# Patient Record
Sex: FEMALE | Employment: FULL TIME | URBAN - METROPOLITAN AREA
[De-identification: names, ages, dates, MRNs, and addresses within clinical notes are randomized per-mention and may not be internally consistent; named-entity substitution may affect disease eponyms.]

---

## 2025-02-19 ENCOUNTER — APPOINTMENT (OUTPATIENT)
Dept: OBSTETRICS AND GYNECOLOGY | Facility: CLINIC | Age: 45
End: 2025-02-19
Payer: COMMERCIAL

## 2025-02-19 ENCOUNTER — TELEPHONE (OUTPATIENT)
Dept: OBSTETRICS AND GYNECOLOGY | Facility: CLINIC | Age: 45
End: 2025-02-19

## 2025-02-19 VITALS
HEIGHT: 61 IN | BODY MASS INDEX: 28.21 KG/M2 | DIASTOLIC BLOOD PRESSURE: 64 MMHG | SYSTOLIC BLOOD PRESSURE: 114 MMHG | WEIGHT: 149.4 LBS

## 2025-02-19 DIAGNOSIS — Z80.9 FAMILY HISTORY OF CANCER: ICD-10-CM

## 2025-02-19 DIAGNOSIS — Z12.31 BREAST CANCER SCREENING BY MAMMOGRAM: ICD-10-CM

## 2025-02-19 DIAGNOSIS — R10.2 VAGINAL PAIN: ICD-10-CM

## 2025-02-19 DIAGNOSIS — R63.5 WEIGHT GAIN: ICD-10-CM

## 2025-02-19 DIAGNOSIS — N95.1 PERIMENOPAUSAL: Primary | ICD-10-CM

## 2025-02-19 DIAGNOSIS — M62.89 PELVIC FLOOR DYSFUNCTION IN FEMALE: ICD-10-CM

## 2025-02-19 PROCEDURE — 3008F BODY MASS INDEX DOCD: CPT | Performed by: NURSE PRACTITIONER

## 2025-02-19 PROCEDURE — 99203 OFFICE O/P NEW LOW 30 MIN: CPT | Performed by: NURSE PRACTITIONER

## 2025-02-19 PROCEDURE — 1036F TOBACCO NON-USER: CPT | Performed by: NURSE PRACTITIONER

## 2025-02-19 ASSESSMENT — ENCOUNTER SYMPTOMS
NEUROLOGICAL NEGATIVE: 0
MUSCULOSKELETAL NEGATIVE: 0
CARDIOVASCULAR NEGATIVE: 0
PSYCHIATRIC NEGATIVE: 0
EYES NEGATIVE: 0
ALLERGIC/IMMUNOLOGIC NEGATIVE: 0
GASTROINTESTINAL NEGATIVE: 0
ENDOCRINE NEGATIVE: 0
HEMATOLOGIC/LYMPHATIC NEGATIVE: 0
CONSTITUTIONAL NEGATIVE: 0
RESPIRATORY NEGATIVE: 0

## 2025-02-19 ASSESSMENT — PATIENT HEALTH QUESTIONNAIRE - PHQ9
2. FEELING DOWN, DEPRESSED OR HOPELESS: NOT AT ALL
1. LITTLE INTEREST OR PLEASURE IN DOING THINGS: NOT AT ALL
SUM OF ALL RESPONSES TO PHQ9 QUESTIONS 1 & 2: 0

## 2025-02-19 ASSESSMENT — PAIN SCALES - GENERAL: PAINLEVEL_OUTOF10: 0-NO PAIN

## 2025-02-19 NOTE — PROGRESS NOTES
"Subjective   Patient ID: Ninoska Loera is a 44 y.o. female who presents for No chief complaint on file..  HPI  PAP?   Mammogram; 5/2/24; negative   Pt presents to establish care. When she made appointment last fall, she was experiencing perimenopausal symptoms. Symptoms not present today.   Her PCP does not perform PAP smears; Last PAP 1/24/22 negative HPV and cytology     Concerns: weight gain  Thought she was experiencing perimenopausal symptoms in the fall for x3 months but admits to external stressors affecting as well, symptoms have since resided.     Using vaginal suppositories for \"pelvic floor pain\"; last used end of July 2024- went to pelvic floor therapy x1/time     Age at Menopause: n/a  Perimenopausal:   LMP 2/1/25- monthly, 26 day cycle length, x 2 days of bleeding - has gradually become lighter   Contraception: no, not needed     History of a DVT/PE: no  History of HTN: no  History of elevated cholesterol: no  Tobacco use: no  Personal history of breast cancer: no    History of HT: no   History of compounded bioidentical: no   History of OTC treatments for menopausal symptoms: no   History of prescription, non-hormonal medications for menopausal symptoms: no    Mood changes: in the fall; has resided   Sleep problems: in the fall; has resided   VMS: no   Joint pain: in the fall; has resided   Brain fog/difficulty concentrating: in the fall; has resided     GSM: vulvar pruritus, trial of vaginal probiotic which helped the pruritus; occurs x2/week - admits to fishy odor with, since December   are you sexually active?: no, has    do you have any loss in desire?: yes, d/t pain   do you have any pain with intercourse?: yes, with insertion and deep penetration; pelvic floor muscle pain   are you able to have an orgasm?: yes       Vaginal hygiene: water and soap   Urinary incontinence: mixed; previously went to PFPT but it was cost prohibitive     H/O of STI: HSV    requesting sti testing?: no  H/O " abnormal pap: 2007     Lives with: , x2 kids   Employment: [employer]   exercise: strength training     Calcium intake: no   Vitamin D intake: no   Increased risk of osteoporosis: no; adopted so unclear on true medical history   Fracture since menopause: no     FH of breast cancer: biological mother   FH of ovarian cancer: no   FH of colon cancer: dad, paternal grandfather   FH pancreatic cancer: biological mother  Review of Systems    Objective   Physical Exam  Genitourinary:     Comments: Mild erythema   No evidence of vulvar dermatoses or vulvodynia  Only pain with palpation of the pelvic floor is anterior at the 12:00 position     Scant vaginal discharge  I reviewed the specimen and these are my findings: wet mount: -yeast, -clue cells, -whiff test; no increase in WBC's          Assessment/Plan   Diagnoses and all orders for this visit:  Perimenopausal  Weight gain  -     Referral to Clinical Pharmacy; Future  Breast cancer screening by mammogram  -     BI mammo bilateral screening tomosynthesis; Future     Will contact pt with results of the vaginitis panel; if negative will consider vaginal estrogen   Perimenopausal s/s reviewed  Information on pelvic floor, Leva, and pelvic wands; pt will contact me with her decision  Referral to medical genetics d/t  cancer  Courtney Kehr, PA-S Jean M Marino, APRN-CNP 02/19/25 8:06 AM

## 2025-02-19 NOTE — TELEPHONE ENCOUNTER
Contacted pt  Name and  verified  Pt is aware of order will download devan and schedule appt at that time.  Pt verbalized understanding.  No further questions or concerns at this time.

## 2025-02-19 NOTE — TELEPHONE ENCOUNTER
----- Message from Giuseppe Dillon sent at 2/19/2025  9:31 AM EST -----  Can someone please contact her, I forgot to tell her that I would recommend medical genetics d/t her FH cancer; I placed the order if she is interested

## 2025-02-20 LAB — BV SCORE VAG QL: NORMAL

## 2025-03-28 NOTE — PROGRESS NOTES
"  Patient ID: Ninoska Loera is a 44 y.o. female who presents for No chief complaint on file..    Pt is here for First appointment.     Referring Provider: Giuseppe Dillon APRN-CNP  Reason for Referral: weight loss    Subjective     Medication and allergy reconciliation completed     Drug Interactions  No relevant drug interactions were noted.    Medication System Management  Patient's preferred pharmacy:     [id number] - [place], [place] - [address]  [address] [facility] [address]  Phone: [phone] Fax: [phone]    Adherence/Organization: n/a  Affordability/Accessibility: n/a    No current outpatient medications on file prior to visit.     No current facility-administered medications on file prior to visit.       Weight Loss  -not a fan of medications  -likes to keep body as clean as possible    LMP: 3/26  Uterus: Yes  Ovaries: Yes    Movement:   Joint pain? No  Osteopenia or osteoporosis: No    Lifestyle  Stress level (rate 1-10): less than 5 out of 10  No results found for: \"CORTFREE\"  Energy level (rate 1-10): 8 out of 10  Sleep? 7 hours (10pm-5-6am), quality is mostly good  Snoring/Sleep apnea? Yes   Recommend sleeping at incline  Daytime brain fog/memory troubles? no  How many steps/day on average? 10-13k steps/day  Recommend 7,000 steps per day, 7 days per week. Use a tracker for this such as oura, fit bit, whoop, etc.  Strength training? Yes  Recommend 2-3x/week for 20 minutes  Consider listening to podcast interviews with Shea Pritchett  Current dietary pattern (if following a specific pattern of eating):   Tracks macros, 100g protein day, [id number] antonio/day, works out 5/day lifting heavy, lifting 10-13k steps/day  6-7am workout  Protein and creatine following, greek yogurt + fruit  Mid-morning snack  Salad with 30g protein (chicken or tuna), cottage cheese, snap peas  Dinner 30g protein + vegetable   Weekends variable  Occasional energy drink (2x/week), coffee " "(Virtify milk), tea, water  1c coffee at waking, rarely caffeine after noon  Recommend small amount of protein prior to workout    Toxins:  Tobacco? No  Alcohol? Occasionally on the weekends - max 2 drinks, typically 1 drink  THC/CBD? No  Heating food in plastic? No  Plastic water bottles? No    History of Gout? No  BP Readings from Last 3 Encounters:   02/19/25 114/64     No results found for: \"URICACID\"    If yes or high fructose diet (soda, hfcs, agave, fruit juice, etc.) or high animal based protein diet order uric acid level.   Uric acid levels above 5.5 mg/dL are linked to increased risk of metabolic diseases.   Fructose metabolism generates uric acid, signaling to the body to increase storage of fat.   Uric acid decreases nitric oxide, constricting blood vessels and impairing insulin function.   High sodium intake can increase uric acid by signaling dehydration, but staying hydrated dilutes sodiums effect.   Elevated levels of uric acid increase hunger and impulsivity.     Medications potentially contributing to weight gain:   None    Medications tried/stopped for weight management:    None       Weight Loss Drug Therapy Options Screening:     Naltrexone and Bupropion  BMI >=30 kg/m2 or patients with a BMI >=27 kg/m2 and >=1 weight-associated comorbidity (eg, dyslipidemia):   Binge eating: No  Depression: No  PMH of Suicidal Ideation: No  Avoid use in patients with:  Uncontrolled hypertension: No  Seizure disorder: No  Eating disorder: No  Use of other bupropion-containing products: No  Chronic opioid use: No  Avoid use if on opioids. Patients treated with naltrexone may respond to lower opioid doses than previously used. This could result in potentially life-threatening opioid intoxication. Warn patients that any attempt to overcome opioid blockade during naltrexone therapy is dangerous and could potentially lead to fatal opioid overdose.  Within 14 days of monoamine oxidase inhibitors: No    GLP-1 and " "Metformin:   Pre-Diabetes/Diabetes? No, extensive family history  Lab Results   Component Value Date    HGBA1C 5.4 09/09/2020     No results found for: \"HSCRP\"    BMI >=30 kg/m2 or patients with a BMI >=27 kg/m2 and >=1 weight-associated comorbidity (eg, cardiovascular disease, dyslipidemia, hypertension, obstructive sleep apnea, type 2 diabetes mellitus):   PMH of Pancreatitis: No  PMH of Gallbladder disease: No  PMH of Delayed Gastric Emptying:   GI issues? Constipation - life long  Frequency of BM? 3x/week  PMH of MTC: no  PMH of Retinopathy: no    Topiramate:   Migraines? no    Adipex:   Anxiety? no    Thyroid health:   No results found for: \"TSH\", \"FREET4\", \"T3FREE\", \"T3\", \"THYROIDPAB\"     Palpitations:   No  Tremor (hands, fine, low-amplitude):   No  -If yes, check TPO antibodies  Menstrual cycle changes: Average every 26 days (25-29 days)     Family history of thyroid disease:   No  -High doses of biotin can cause falsely low TSH levels and falsely high levels of T3 and T4.   -Hold biotin for 3-5 days prior to lab draw     MHT  No results found for: \"ESTRADIOL\", \"TESTOST\", \"MFTEST\", \"MTESTO\", \"FSH\"  Current VMS? Maybe, isn't sure  Any Contraindications and/or Cautions to HT:   PH/FH breast cancer: Mother  PH/FH of ovarian cancer: No  PH/FH of uterine cancer: No  PH/FH of colon cancer: Father  PH/FH of pancreatic cancer: Mother  PH/FH Dementia: No  Stroke, MI, VTE, clotting disorder, or congenital heart disease: No  Systemic lupus erythematosus (increased clot risk): No    Concurrent Chronic Medical Conditions    Cardiovascular Health  The ASCVD Risk score (Casa DK, et al., 2019) failed to calculate for the following reasons:    Cannot find a previous HDL lab    Cannot find a previous total cholesterol lab    Unable to determine if patient is Non-   No results found for: \"CHOL\"  No results found for: \"HDL\"  No results found for: \"TRIG\"       No results found for: \"SBP\", " "\"DBP\"    Iron/B12/Folate Status  No results found for: \"IRON\", \"TIBC\", \"FERRITIN\"   No results found for: \"GEZMPKDJ93\", \"FOLATE\"    Liver Function  No results found for: \"AST\", \"ALT\", \"GGT\"     Kidney Function  No results found for: \"GFRF\", \"CREATININE\", \"ALBCREA\"    Vitamin D3  No results found for: \"VITD25\"      Lifestyle and Nourishment Recommendations (please review following our appointment)  Focus on whole foods as close to nature as possible (less than 5 ingredients on the label)  The order of eating matters during each meal, in order to minimize blood sugar spikes  First, 1/3 to 1/2 of meal as colorful vegetables eaten first  Increase fiber 25-30 grams daily (work up slowly to avoid GI distress)  Goal: Daily bowel movement  Second, eat protein and fat as part of your meal  Increase high quality protein to 25-30 grams per meal along with 2-3 x/week resistance training  May consider 3g/day of Creatine (CreaPure) daily mixed in water to support mood, cognition, muscle mass, and bone mass.    Third, have complex carbohydrates/starches as part of your meal  Consider drinking 1 tbsp of apple cider vinegar in a tall glass of water or as a salad dressing with extra virgin olive oil up to 20 minutes prior to or during a meal.   Helps to reduce blood sugar spikes from complex carbohydrates by up to 30%.   Beverages: Focus on filtered water, organic tea (loose leave or in paper, avoid plastic sachets), or sparkling/mineral water (ex. Spindrift, Erika)   No more than 1 cup (8oz) of organic coffee daily prior to noon. May consider organic green tea.   Avoid alcohol   Avoid ALL artificial sweeteners   Focus on building muscle- muscle is metabolic tissue  Strength training 2-3 times weekly for at least 20 minutes, max out at 8 reps, rest, repeat x 3.   Walk or move for 10 minutes after each meal - ideally outdoors  Goal: 7,000 steps per day, 7 days per week. Use a tracker for this such as oura, fit bit, whoop, etc. "   Manage stress: minimum of 2-5 minutes daily for meditation, minfulness, etc. to reduce cortisol levels.  Prioritize 7-9 hours of restful sleep nightly.   Turn off electronics 1 hour prior to bedtime, no electronics in the bedroom.  Establish a regular sleep/wake time (+/- 30 minutes)       Assessment/Plan     Patients goals:   To loose about 10 lbs  Discussed patients history, current medical conditions, medications, as well as current diet and lifestyle in depth.   Patient has been making diet/lifestyle changes for:   Current weight: 146 lbs  BMI: 28  Desired weight: 125 lbs (2010 last time, ran 1st marathon) - 135 lbs  Concurrent medical conditions: None  Heart Rate Variability (HRV) if known: Has a [place] watch, will check  Weight loss  Ordering labs today  Prefers not to utilize medications  MHT  Possible VMS, variable symptoms  Lifestyle:   Previously was running marathons, has stopped this to keep cortisol levels down. Now lifting heavy 5 days/week. Getting 10-13k/steps. Feels better/stronger focusing on weights than running.   Has a body comp scale at home, has noticed some muscle gain, but not significant  Getting 100g of protein/day  Eating very clean  Please review the above lifestyle and nourishment recommendations between now and our next appointment.   Please choose 1-3 items you are not currently doing to implement.   Consider utilizing Cronometer to track dietary intake for 3-5 days.   Consider utilizing a CGM for 14 days.   Blood sugar spikes are often followed by dips, which increase food cravings for the remainder of the day      LABS  Fasting insulin  Fasting glucose  A1C  TSH  Free T4  TPO  Uric Acid  Vitamin D  HS-CRP (if suspect inflammation)      Follow-up: 04/17/25 3:40pm     Time spent with pt: Total length of time 40 (minutes) of the encounter and more than 50% was spent counseling the patient.      Buffy Gamboa, Pharm.D, FAIHM, IFP, [facility]  Clinical Pharmacist  Pharmacy  Services  [phone]    Continue all meds under the continuation of care with the referring provider and clinical pharmacy team.    Verbal consent to manage patient's drug therapy was obtained from patient. They were informed they may decline to participate or withdraw from participation in pharmacy services at any time.

## 2025-03-31 ENCOUNTER — APPOINTMENT (OUTPATIENT)
Dept: PHARMACY | Facility: HOSPITAL | Age: 45
End: 2025-03-31
Payer: COMMERCIAL

## 2025-03-31 DIAGNOSIS — R63.5 WEIGHT GAIN: ICD-10-CM

## 2025-03-31 RX ORDER — BISMUTH SUBSALICYLATE 262 MG
1 TABLET,CHEWABLE ORAL DAILY
COMMUNITY

## 2025-03-31 RX ORDER — GLUCOSAM/CHONDRO/HERB 149/HYAL 750-100 MG
1 TABLET ORAL DAILY
COMMUNITY

## 2025-04-04 LAB
25(OH)D3+25(OH)D2 SERPL-MCNC: 29 NG/ML (ref 30–100)
ALBUMIN SERPL-MCNC: 4.8 G/DL (ref 3.6–5.1)
ALP SERPL-CCNC: 52 U/L (ref 31–125)
ALT SERPL-CCNC: 22 U/L (ref 6–29)
ANION GAP SERPL CALCULATED.4IONS-SCNC: 11 MMOL/L (CALC) (ref 7–17)
AST SERPL-CCNC: 21 U/L (ref 10–30)
BILIRUB SERPL-MCNC: 0.6 MG/DL (ref 0.2–1.2)
BUN SERPL-MCNC: 15 MG/DL (ref 7–25)
CALCIUM SERPL-MCNC: 9.4 MG/DL (ref 8.6–10.2)
CHLORIDE SERPL-SCNC: 100 MMOL/L (ref 98–110)
CO2 SERPL-SCNC: 27 MMOL/L (ref 20–32)
CREAT SERPL-MCNC: 0.78 MG/DL (ref 0.5–0.99)
EGFRCR SERPLBLD CKD-EPI 2021: 96 ML/MIN/1.73M2
EST. AVERAGE GLUCOSE BLD GHB EST-MCNC: 111 MG/DL
EST. AVERAGE GLUCOSE BLD GHB EST-SCNC: 6.2 MMOL/L
GLUCOSE SERPL-MCNC: 79 MG/DL (ref 65–99)
HBA1C MFR BLD: 5.5 % OF TOTAL HGB
INSULIN SERPL-ACNC: 3.2 UIU/ML
POTASSIUM SERPL-SCNC: 4.3 MMOL/L (ref 3.5–5.3)
PROT SERPL-MCNC: 7.6 G/DL (ref 6.1–8.1)
SODIUM SERPL-SCNC: 138 MMOL/L (ref 135–146)
T3FREE SERPL-MCNC: 3.3 PG/ML (ref 2.3–4.2)
TSH SERPL-ACNC: 1.97 MIU/L

## 2025-04-10 ENCOUNTER — APPOINTMENT (OUTPATIENT)
Facility: CLINIC | Age: 45
End: 2025-04-10
Payer: COMMERCIAL

## 2025-04-10 ENCOUNTER — TELEPHONE (OUTPATIENT)
Dept: GENETICS | Facility: CLINIC | Age: 45
End: 2025-04-10

## 2025-04-15 NOTE — PROGRESS NOTES
"  Patient ID: Ninoska Loera is a 44 y.o. female who presents for No chief complaint on file..    Pt is here for Follow Up appointment.     Referring Provider: Giuseppe Dillon APRN-CNP  Reason for Referral: weight loss    Subjective     Medication and allergy reconciliation completed     Drug Interactions  No relevant drug interactions were noted.    Medication System Management  Patient's preferred pharmacy:     [facility] [place], [place] - [address]  [address] [address]  Phone: [phone] Fax: [phone]    Adherence/Organization: n/a  Affordability/Accessibility: n/a    Current Outpatient Medications on File Prior to Visit   Medication Sig Dispense Refill    B.animalis,bifid,infantis,long (PROBIOTIC 4X ORAL) Take 1 tablet by mouth once daily.      CREATINE, BULK, MISC 1 Scoop once daily.      multivitamin tablet Take 1 tablet by mouth once daily.      omega 3-dha-epa-fish oil (Fish OiL) 1,000 (120-180) mg capsule Take 1 capsule (1,000 mg) by mouth once daily.       No current facility-administered medications on file prior to visit.       Weight Loss  -not a fan of medications  -likes to keep body as clean as possible    LMP: 3/26  Uterus: Yes  Ovaries: Yes    Movement:   Joint pain? No  Osteopenia or osteoporosis: No    Lifestyle  Stress level (rate 1-10): less than 5 out of 10  No results found for: \"CORTFREE\"  Energy level (rate 1-10): 8 out of 10  Sleep? 7 hours (10pm-5-6am), quality is mostly good  Snoring/Sleep apnea? Yes   Recommend sleeping at incline  Daytime brain fog/memory troubles? no  How many steps/day on average? 10-13k steps/day  Recommend 7,000 steps per day, 7 days per week. Use a tracker for this such as oura, fit bit, whoop, etc.  Strength training? Yes  Recommend 2-3x/week for 20 minutes  Consider listening to podcast interviews with Shea Pritchett  Current dietary pattern (if following a specific pattern of eating):   Tracks macros, 100g " "protein day, 9537-6563 antonio/day, works out 5/day lifting heavy, lifting 10-13k steps/day  6-7am workout  Protein and creatine following, greek yogurt + fruit  Mid-morning snack  Salad with 30g protein (chicken or tuna), cottage cheese, snap peas  Dinner 30g protein + vegetable   Weekends variable  Occasional energy drink (2x/week), coffee (fairlife milk), tea, water  1c coffee at waking, rarely caffeine after noon  Recommend small amount of protein prior to workout    Toxins:  Tobacco? No  Alcohol? Occasionally on the weekends - max 2 drinks, typically 1 drink  THC/CBD? No  Heating food in plastic? No  Plastic water bottles? No    History of Gout? No  BP Readings from Last 3 Encounters:   02/19/25 114/64     No results found for: \"URICACID\"    If yes or high fructose diet (soda, hfcs, agave, fruit juice, etc.) or high animal based protein diet order uric acid level.   Uric acid levels above 5.5 mg/dL are linked to increased risk of metabolic diseases.   Fructose metabolism generates uric acid, signaling to the body to increase storage of fat.   Uric acid decreases nitric oxide, constricting blood vessels and impairing insulin function.   High sodium intake can increase uric acid by signaling dehydration, but staying hydrated dilutes sodiums effect.   Elevated levels of uric acid increase hunger and impulsivity.     Medications potentially contributing to weight gain:   None    Medications tried/stopped for weight management:    None       Weight Loss Drug Therapy Options Screening:     Naltrexone and Bupropion  BMI >=30 kg/m2 or patients with a BMI >=27 kg/m2 and >=1 weight-associated comorbidity (eg, dyslipidemia):   Binge eating: No  Depression: No  PMH of Suicidal Ideation: No  Avoid use in patients with:  Uncontrolled hypertension: No  Seizure disorder: No  Eating disorder: No  Use of other bupropion-containing products: No  Chronic opioid use: No  Avoid use if on opioids. Patients treated with naltrexone may " "respond to lower opioid doses than previously used. This could result in potentially life-threatening opioid intoxication. Warn patients that any attempt to overcome opioid blockade during naltrexone therapy is dangerous and could potentially lead to fatal opioid overdose.  Within 14 days of monoamine oxidase inhibitors: No    GLP-1 and Metformin:   Pre-Diabetes/Diabetes? No, extensive family history  Lab Results   Component Value Date    INSULFAST 3.2 04/03/2025    HGBA1C 5.5 04/03/2025     No results found for: \"HSCRP\"    BMI >=30 kg/m2 or patients with a BMI >=27 kg/m2 and >=1 weight-associated comorbidity (eg, cardiovascular disease, dyslipidemia, hypertension, obstructive sleep apnea, type 2 diabetes mellitus):   PMH of Pancreatitis: No  PMH of Gallbladder disease: No  PMH of Delayed Gastric Emptying:   GI issues? Constipation - life long  Frequency of BM? 3x/week  PMH of MTC: no  PMH of Retinopathy: no    Topiramate:   Migraines? no    Adipex:   Anxiety? no    Thyroid health:   Lab Results   Component Value Date    TSH 1.97 04/03/2025    T3FREE 3.3 04/03/2025        Palpitations:   No  Tremor (hands, fine, low-amplitude):   No  -If yes, check TPO antibodies  Menstrual cycle changes: Average every 26 days (25-29 days)     Family history of thyroid disease:   No  -High doses of biotin can cause falsely low TSH levels and falsely high levels of T3 and T4.   -Hold biotin for 3-5 days prior to lab draw     MHT  No results found for: \"ESTRADIOL\", \"TESTOST\", \"MFTEST\", \"MTESTO\", \"FSH\"  Current VMS? Maybe, isn't sure  Any Contraindications and/or Cautions to HT:   PH/FH breast cancer: Mother  PH/FH of ovarian cancer: No  PH/FH of uterine cancer: No  PH/FH of colon cancer: Father  PH/FH of pancreatic cancer: Mother  PH/FH Dementia: No  Stroke, MI, VTE, clotting disorder, or congenital heart disease: No  Systemic lupus erythematosus (increased clot risk): No    Concurrent Chronic Medical Conditions    Cardiovascular " "Health  The ASCVD Risk score (Casa VERA, et al., 2019) failed to calculate for the following reasons:    Cannot find a previous HDL lab    Cannot find a previous total cholesterol lab    Unable to determine if patient is Non-   No results found for: \"CHOL\"  No results found for: \"HDL\"  No results found for: \"TRIG\"       No results found for: \"SBP\", \"DBP\"    Iron/B12/Folate Status  No results found for: \"IRON\", \"TIBC\", \"FERRITIN\"   No results found for: \"FZPYVQMM55\", \"FOLATE\"    Liver Function  Lab Results   Component Value Date    AST 21 04/03/2025    ALT 22 04/03/2025        Kidney Function  Lab Results   Component Value Date    CREATININE 0.78 04/03/2025       Vitamin D3  Lab Results   Component Value Date    VITD25 29 (L) 04/03/2025         Lifestyle and Nourishment Recommendations (please review following our appointment)  Focus on whole foods as close to nature as possible (less than 5 ingredients on the label)  The order of eating matters during each meal, in order to minimize blood sugar spikes  First, 1/3 to 1/2 of meal as colorful vegetables eaten first  Increase fiber 25-30 grams daily (work up slowly to avoid GI distress)  Goal: Daily bowel movement  Second, eat protein and fat as part of your meal  Increase high quality protein to 25-30 grams per meal along with 2-3 x/week resistance training  May consider 3g/day of Creatine (CreaPure) daily mixed in water to support mood, cognition, muscle mass, and bone mass.    Third, have complex carbohydrates/starches as part of your meal  Consider drinking 1 tbsp of apple cider vinegar in a tall glass of water or as a salad dressing with extra virgin olive oil up to 20 minutes prior to or during a meal.   Helps to reduce blood sugar spikes from complex carbohydrates by up to 30%.   Beverages: Focus on filtered water, organic tea (loose leave or in paper, avoid plastic sachets), or sparkling/mineral water (ex. Spindrift, Erika)   No more " than 1 cup (8oz) of organic coffee daily prior to noon. May consider organic green tea.   Avoid alcohol   Avoid ALL artificial sweeteners   Focus on building muscle- muscle is metabolic tissue  Strength training 2-3 times weekly for at least 20 minutes, max out at 8 reps, rest, repeat x 3.   Walk or move for 10 minutes after each meal - ideally outdoors  Goal: 7,000 steps per day, 7 days per week. Use a tracker for this such as oura, fit bit, whoop, etc.   Manage stress: minimum of 2-5 minutes daily for meditation, minfulness, etc. to reduce cortisol levels.  Prioritize 7-9 hours of restful sleep nightly.   Turn off electronics 1 hour prior to bedtime, no electronics in the bedroom.  Establish a regular sleep/wake time (+/- 30 minutes)       Assessment/Plan     Patients goals:   To loose about 10 lbs  Discussed patients history, current medical conditions, medications, as well as current diet and lifestyle in depth.   Patient has been making diet/lifestyle changes for:   Current weight: 146 lbs  BMI: 28  Desired weight: 125 lbs (2010 last time, ran 1st marathon) - 135 lbs  Concurrent medical conditions: None  Heart Rate Variability (HRV) if known: Has a [employer] watch, will check  Weight loss  Recommend adding Vitamin D 5000 international units daily, with a fat containing meal, goal 50-70.    Prefers not to utilize medications for weight loss  Provided patient education and counseling on Freestyle Kimberly CGM  Provided link to [employer]  MHT  Possible VMS, variable symptoms  Lifestyle:   Previously was running marathons, has stopped this to keep cortisol levels down. Now lifting heavy 5 days/week. Getting 10-13k/steps. Feels better/stronger focusing on weights than running.   Has a body comp scale at home, has noticed some muscle gain, but not significant  Getting 100g of protein/day  Eating very clean  Please review the above lifestyle and nourishment recommendations between now and our next appointment.    Please choose 1-3 items you are not currently doing to implement.         Follow-up: 5/6/25 4pm     Time spent with pt: Total length of time 20 (minutes) of the encounter and more than 50% was spent counseling the patient.      Buffy Gamboa, Pharm.D, FAIHM, IFP, [facility]  Clinical Pharmacist  Pharmacy Services  [phone]    Continue all meds under the continuation of care with the referring provider and clinical pharmacy team.    Verbal consent to manage patient's drug therapy was obtained from patient. They were informed they may decline to participate or withdraw from participation in pharmacy services at any time.

## 2025-04-17 ENCOUNTER — APPOINTMENT (OUTPATIENT)
Dept: PHARMACY | Facility: HOSPITAL | Age: 45
End: 2025-04-17
Payer: COMMERCIAL

## 2025-04-17 DIAGNOSIS — R63.5 WEIGHT GAIN: ICD-10-CM

## 2025-04-17 PROCEDURE — RXMED WILLOW AMBULATORY MEDICATION CHARGE

## 2025-04-17 RX ORDER — BLOOD-GLUCOSE SENSOR
EACH MISCELLANEOUS
Qty: 1 EACH | Refills: 11 | Status: SHIPPED | OUTPATIENT
Start: 2025-04-17

## 2025-04-18 ENCOUNTER — PHARMACY VISIT (OUTPATIENT)
Dept: PHARMACY | Facility: CLINIC | Age: 45
End: 2025-04-18
Payer: MEDICARE

## 2025-05-06 ENCOUNTER — APPOINTMENT (OUTPATIENT)
Dept: PHARMACY | Facility: HOSPITAL | Age: 45
End: 2025-05-06
Payer: COMMERCIAL

## 2025-05-06 DIAGNOSIS — R63.5 WEIGHT GAIN: ICD-10-CM

## 2025-05-06 NOTE — PROGRESS NOTES
"  Patient ID: Ninoska Loera is a 44 y.o. female who presents for No chief complaint on file..    Pt is here for Follow Up appointment.     Referring Provider: Giuseppe Dillon APRN-CNP  Reason for Referral: weight loss    Subjective     Medication and allergy reconciliation completed     Drug Interactions  No relevant drug interactions were noted.    Medication System Management  Patient's preferred pharmacy:     [facility] - [place], [place] - [address]  [address] [id number]  Phone: [phone] Fax: [phone]    [facility]  [address], [address]  [facility] [address]  Phone: [phone] Fax: [phone]    Adherence/Organization: n/a  Affordability/Accessibility: n/a    Current Outpatient Medications on File Prior to Visit   Medication Sig Dispense Refill    B.animalis,bifid,infantis,long (PROBIOTIC 4X ORAL) Take 1 tablet by mouth once daily.      blood-glucose sensor (FreeStyle Kimberly 3 Plus Sensor) device Apply/remove 1 sensor every 15 days as directed 1 each 11    CREATINE, BULK, MISC 1 Scoop once daily.      multivitamin tablet Take 1 tablet by mouth once daily.      omega 3-dha-epa-fish oil (Fish OiL) 1,000 (120-180) mg capsule Take 1 capsule (1,000 mg) by mouth once daily.       No current facility-administered medications on file prior to visit.       Weight Loss  -not a fan of medications  -likes to keep body as clean as possible    LMP: 3/26  Uterus: Yes  Ovaries: Yes    Movement:   Joint pain? No  Osteopenia or osteoporosis: No    Lifestyle  Stress level (rate 1-10): less than 5 out of 10  No results found for: \"CORTFREE\"  Energy level (rate 1-10): 8 out of 10  Sleep? 7 hours (10pm-5-6am), quality is mostly good  Snoring/Sleep apnea? Yes   Recommend sleeping at incline  Daytime brain fog/memory troubles? no  How many steps/day on average? 10-13k steps/day  Recommend 7,000 steps per day, 7 days per week. Use a tracker for this such " "as oura, fit bit, whoop, etc.  Strength training? Yes  Recommend 2-3x/week for 20 minutes  Consider listening to podcast interviews with Shea Pritchett  Current dietary pattern (if following a specific pattern of eating):   Tracks macros, 100g protein day, 9541-9562 antonio/day, works out 5/day lifting heavy, lifting 10-13k steps/day  6-7am workout  Protein and creatine following, greek yogurt + fruit  Mid-morning snack  Salad with 30g protein (chicken or tuna), cottage cheese, snap peas  Dinner 30g protein + vegetable   Weekends variable  Occasional energy drink (2x/week), coffee (fairlife milk), tea, water  1c coffee at waking, rarely caffeine after noon  Recommend small amount of protein prior to workout    Toxins:  Tobacco? No  Alcohol? Occasionally on the weekends - max 2 drinks, typically 1 drink  THC/CBD? No  Heating food in plastic? No  Plastic water bottles? No    History of Gout? No  BP Readings from Last 3 Encounters:   02/19/25 114/64     No results found for: \"URICACID\"    If yes or high fructose diet (soda, hfcs, agave, fruit juice, etc.) or high animal based protein diet order uric acid level.   Uric acid levels above 5.5 mg/dL are linked to increased risk of metabolic diseases.   Fructose metabolism generates uric acid, signaling to the body to increase storage of fat.   Uric acid decreases nitric oxide, constricting blood vessels and impairing insulin function.   High sodium intake can increase uric acid by signaling dehydration, but staying hydrated dilutes sodiums effect.   Elevated levels of uric acid increase hunger and impulsivity.     Medications potentially contributing to weight gain:   None    Medications tried/stopped for weight management:    None       Weight Loss Drug Therapy Options Screening:     Naltrexone and Bupropion  BMI >=30 kg/m2 or patients with a BMI >=27 kg/m2 and >=1 weight-associated comorbidity (eg, dyslipidemia):   Binge eating: No  Depression: No  PMH of Suicidal Ideation: " "No  Avoid use in patients with:  Uncontrolled hypertension: No  Seizure disorder: No  Eating disorder: No  Use of other bupropion-containing products: No  Chronic opioid use: No  Avoid use if on opioids. Patients treated with naltrexone may respond to lower opioid doses than previously used. This could result in potentially life-threatening opioid intoxication. Warn patients that any attempt to overcome opioid blockade during naltrexone therapy is dangerous and could potentially lead to fatal opioid overdose.  Within 14 days of monoamine oxidase inhibitors: No    GLP-1 and Metformin:   Pre-Diabetes/Diabetes? No, extensive family history  Lab Results   Component Value Date    INSULFAST 3.2 04/03/2025    HGBA1C 5.5 04/03/2025     No results found for: \"HSCRP\"    BMI >=30 kg/m2 or patients with a BMI >=27 kg/m2 and >=1 weight-associated comorbidity (eg, cardiovascular disease, dyslipidemia, hypertension, obstructive sleep apnea, type 2 diabetes mellitus):   PMH of Pancreatitis: No  PMH of Gallbladder disease: No  PMH of Delayed Gastric Emptying:   GI issues? Constipation - life long  Frequency of BM? 3x/week  PMH of MTC: no  PMH of Retinopathy: no    Topiramate:   Migraines? no    Adipex:   Anxiety? no    Thyroid health:   Lab Results   Component Value Date    TSH 1.97 04/03/2025    T3FREE 3.3 04/03/2025        Palpitations:   No  Tremor (hands, fine, low-amplitude):   No  -If yes, check TPO antibodies  Menstrual cycle changes: Average every 26 days (25-29 days)     Family history of thyroid disease:   No  -High doses of biotin can cause falsely low TSH levels and falsely high levels of T3 and T4.   -Hold biotin for 3-5 days prior to lab draw     MHT  No results found for: \"ESTRADIOL\", \"TESTOST\", \"MFTEST\", \"MTESTO\", \"FSH\"  Current VMS? Maybe, isn't sure  Any Contraindications and/or Cautions to HT:   PH/FH breast cancer: Mother  PH/FH of ovarian cancer: No  PH/FH of uterine cancer: No  PH/FH of colon cancer: " "Father  PH/FH of pancreatic cancer: Mother  PH/FH Dementia: No  Stroke, MI, VTE, clotting disorder, or congenital heart disease: No  Systemic lupus erythematosus (increased clot risk): No    Concurrent Chronic Medical Conditions    Cardiovascular Health  The ASCVD Risk score (Casa VERA, et al., 2019) failed to calculate for the following reasons:    Cannot find a previous HDL lab    Cannot find a previous total cholesterol lab  No results found for: \"CHOL\"  No results found for: \"HDL\"  No results found for: \"TRIG\"       No results found for: \"SBP\", \"DBP\"    Iron/B12/Folate Status  No results found for: \"IRON\", \"TIBC\", \"FERRITIN\"   No results found for: \"[id number]\", \"FOLATE\"    Liver Function  Lab Results   Component Value Date    AST 21 04/03/2025    ALT 22 04/03/2025        Kidney Function  Lab Results   Component Value Date    CREATININE 0.78 04/03/2025       Vitamin D3  Lab Results   Component Value Date    VITD25 29 (L) 04/03/2025         Lifestyle and Nourishment Recommendations (please review following our appointment)  Focus on whole foods as close to nature as possible (less than 5 ingredients on the label)  The order of eating matters during each meal, in order to minimize blood sugar spikes  First, 1/3 to 1/2 of meal as colorful vegetables eaten first  Increase fiber 25-30 grams daily (work up slowly to avoid GI distress)  Goal: Daily bowel movement  Second, eat protein and fat as part of your meal  Increase high quality protein to 25-30 grams per meal along with 2-3 x/week resistance training  May consider 3g/day of Creatine (CreaPure) daily mixed in water to support mood, cognition, muscle mass, and bone mass.    Third, have complex carbohydrates/starches as part of your meal  Consider drinking 1 tbsp of apple cider vinegar in a tall glass of water or as a salad dressing with extra virgin olive oil up to 20 minutes prior to or during a meal.   Helps to reduce blood sugar spikes from complex " carbohydrates by up to 30%.   Beverages: Focus on filtered water, organic tea (loose leave or in paper, avoid plastic sachets), or sparkling/mineral water (ex. Spindrift, Erika)   No more than 1 cup (8oz) of organic coffee daily prior to noon. May consider organic green tea.   Avoid alcohol   Avoid ALL artificial sweeteners   Focus on building muscle- muscle is metabolic tissue  Strength training 2-3 times weekly for at least 20 minutes, max out at 8 reps, rest, repeat x 3.   Walk or move for 10 minutes after each meal - ideally outdoors  Goal: 7,000 steps per day, 7 days per week. Use a tracker for this such as [employer], fit bit, Chideo, etc.   Manage stress: minimum of 2-5 minutes daily for meditation, minfulness, etc. to reduce cortisol levels.  Prioritize 7-9 hours of restful sleep nightly.   Turn off electronics 1 hour prior to bedtime, no electronics in the bedroom.  Establish a regular sleep/wake time (+/- 30 minutes)       Assessment/Plan     Patients goals:   To loose about 10 lbs  Discussed patients history, current medical conditions, medications, as well as current diet and lifestyle in depth.   Patient has been making diet/lifestyle changes for:   Current weight: 146 lbs  BMI: 28  Desired weight: 125 lbs (2010 last time, ran 1st marathon) - 135 lbs  Concurrent medical conditions: None  Weight loss  Prefers not to utilize medications for weight loss  No change in weight, has maintained 145 lbs.    Wore Kimberly CGM  Experienced multiple overnight lows towards the beginning of wearing the sensor, improved after about 1 week.   Trying not to get too hungry, has started:  Mid morning protein snack  Lunch Salad with 30-35 grams/protein  Protein snack mid afternoon  Dinner variable  Tracks macro's until mid-afternoon, would like to work on consistency with tracking  Dinners are planned for the week, but doesn't always go according to planned  Has observed that if she gets too hungry she doesn't make as good of  choices.   Plans to try -300 calories under energy expenditure.   We discussed the impact of low blood sugar on cortisol levels, and ensuring she is having regular protein forward meals and snacks as needed to avoid lows.   Placed referral to [facility]  MHT  Possible VMS, variable symptoms  Lifestyle:   Previously was running marathons, has stopped this to keep cortisol levels down. Now lifting heavy 5 days/week. Getting 10-13k/steps. Feels better/stronger focusing on weights than running.   Has a body comp scale at home, has noticed some muscle gain, but not significant  Getting 100g of protein/day  Eating very clean      Follow-up: prn     Time spent with pt: Total length of time 30 (minutes) of the encounter and more than 50% was spent counseling the patient.      Buffy Gamboa, Pharm.D, FAM, IFP, [facility]  Clinical Pharmacist  Pharmacy Services  [phone]    Continue all meds under the continuation of care with the referring provider and clinical pharmacy team.    Verbal consent to manage patient's drug therapy was obtained from patient. They were informed they may decline to participate or withdraw from participation in pharmacy services at any time.

## 2025-06-30 ENCOUNTER — APPOINTMENT (OUTPATIENT)
Dept: OBSTETRICS AND GYNECOLOGY | Facility: CLINIC | Age: 45
End: 2025-06-30
Payer: COMMERCIAL